# Patient Record
Sex: MALE | Race: WHITE
[De-identification: names, ages, dates, MRNs, and addresses within clinical notes are randomized per-mention and may not be internally consistent; named-entity substitution may affect disease eponyms.]

---

## 2017-06-26 ENCOUNTER — HOSPITAL ENCOUNTER (OUTPATIENT)
Dept: HOSPITAL 17 - PLAB | Age: 52
End: 2017-06-26
Attending: FAMILY MEDICINE
Payer: COMMERCIAL

## 2017-06-26 DIAGNOSIS — E78.5: Primary | ICD-10-CM

## 2017-06-26 LAB
ALP SERPL-CCNC: 103 U/L (ref 45–117)
ALT SERPL-CCNC: 31 U/L (ref 12–78)
ANION GAP SERPL CALC-SCNC: 8 MEQ/L (ref 5–15)
AST SERPL-CCNC: 17 U/L (ref 15–37)
BILIRUB SERPL-MCNC: 0.3 MG/DL (ref 0.2–1)
BUN SERPL-MCNC: 13 MG/DL (ref 7–18)
CHLORIDE SERPL-SCNC: 105 MEQ/L (ref 98–107)
GFR SERPLBLD BASED ON 1.73 SQ M-ARVRAT: 70 ML/MIN (ref 89–?)
HCO3 BLD-SCNC: 27.6 MEQ/L (ref 21–32)
HDLC SERPL-MCNC: 52.7 MG/DL (ref 40–60)
LDLC SERPL-MCNC: 121 MG/DL (ref 0–99)
POTASSIUM SERPL-SCNC: 3.8 MEQ/L (ref 3.5–5.1)
SODIUM SERPL-SCNC: 141 MEQ/L (ref 136–145)

## 2017-06-26 PROCEDURE — 80053 COMPREHEN METABOLIC PANEL: CPT

## 2017-06-26 PROCEDURE — 80061 LIPID PANEL: CPT

## 2017-06-26 PROCEDURE — 36415 COLL VENOUS BLD VENIPUNCTURE: CPT

## 2018-02-15 ENCOUNTER — HOSPITAL ENCOUNTER (EMERGENCY)
Dept: HOSPITAL 17 - PHED | Age: 53
Discharge: HOME | End: 2018-02-15
Payer: SELF-PAY

## 2018-02-15 VITALS — DIASTOLIC BLOOD PRESSURE: 122 MMHG | SYSTOLIC BLOOD PRESSURE: 211 MMHG

## 2018-02-15 VITALS
OXYGEN SATURATION: 99 % | TEMPERATURE: 98.1 F | HEART RATE: 67 BPM | SYSTOLIC BLOOD PRESSURE: 253 MMHG | RESPIRATION RATE: 16 BRPM | DIASTOLIC BLOOD PRESSURE: 129 MMHG

## 2018-02-15 VITALS — WEIGHT: 176.37 LBS | BODY MASS INDEX: 23.37 KG/M2 | HEIGHT: 73 IN

## 2018-02-15 VITALS — DIASTOLIC BLOOD PRESSURE: 132 MMHG | SYSTOLIC BLOOD PRESSURE: 223 MMHG

## 2018-02-15 VITALS — SYSTOLIC BLOOD PRESSURE: 216 MMHG | DIASTOLIC BLOOD PRESSURE: 125 MMHG

## 2018-02-15 DIAGNOSIS — I10: ICD-10-CM

## 2018-02-15 DIAGNOSIS — Z79.899: ICD-10-CM
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
## 2018-02-15 DIAGNOSIS — S20.212A: Primary | ICD-10-CM

## 2018-02-15 DIAGNOSIS — Z91.14: ICD-10-CM

## 2018-02-15 DIAGNOSIS — F17.210: ICD-10-CM

## 2018-02-15 PROCEDURE — 96372 THER/PROPH/DIAG INJ SC/IM: CPT

## 2018-02-15 PROCEDURE — 71111 X-RAY EXAM RIBS/CHEST4/> VWS: CPT

## 2018-02-15 PROCEDURE — 99284 EMERGENCY DEPT VISIT MOD MDM: CPT

## 2018-02-15 PROCEDURE — 93005 ELECTROCARDIOGRAM TRACING: CPT

## 2018-02-15 NOTE — RADRPT
EXAM DATE/TIME:  02/15/2018 09:12 

 

[facility] COMPARISON:     

No previous studies available for comparison.

 

                     

INDICATIONS :     

Left lateral chest/rib pain post fall onto nightstand.

                     

 

MEDICAL HISTORY :     

Hypertension.       Smoker.   

 

SURGICAL HISTORY :     

None.   

 

ENCOUNTER:     

Initial                                        

 

ACUITY:     

3 days      

 

PAIN SCORE:     

8/10

 

LOCATION:     

Left lateral ribs

 

FINDINGS:     

Multiple views of both ribs were performed.  There is no evidence of displaced fracture.  No destruct
bernardo lesions or areas of periosteal thickening are seen.

 

Expiratory view of the chest is negative for pneumothorax.  The mediastinal structures are midline. 

 

CONCLUSION:     

No acute disease.  

 

No evidence of displaced rib fracture.

 

 

 

 Gordon Ash MD on February 15, 2018 at 9:32           

Board Certified Radiologist.

 This report was verified electronically.

## 2018-02-15 NOTE — PD
HPI


Chief Complaint:  Injury


Time Seen by Provider:  09:02


Travel History


International Travel<30 days:  No


Contact w/Intl Traveler<30days:  No


Traveled to known affect area:  No





History of Present Illness


HPI


52-year-old male came to the emergency room with history of left-sided chest 

pain that sounds mostly musculoskeletal in origin.  Patient says that he fell 

Monday and landed on a bedside table.  He had pain almost soon after that.  

Patient tried to take care of the pain at home by taking some Advil.  However 

it has not gotten better and his friends recommended that he should come in 

since he could have a risk of puncturing his lung.  Patient says the pain is 

worse when he takes a deep breath.  It's tender to touch.  Patient also has 

history of hypertension but lost his physician 2 months ago and took his last 

dose of medication then.  His blood pressure was extremely high when he arrived 

in triage.  Patient denies of any headache or blurred vision.  No radiation of 

the pain.  No associated symptoms like shortness of breath, fever, cough or 

hemoptysis.





[facility]


Past Medical History


*** Narrative Medical


List of his past medical, surgical, social and family history is reviewed from 

the nursing note.


Cancer:  No


Cardiovascular Problems:  No


Diminished Hearing:  No


Endocrine:  No


Genitourinary:  No


Immune Disorder:  No


Musculoskeletal:  No


Neurologic:  No


Psychiatric:  No


Reproductive:  No


Respiratory:  No





Social History


Alcohol Use:  Yes ( STATES 18 BEERS PER WK)


Tobacco Use:  Yes (1PPD)


Substance Use:  No





Allergies-Medications


(Allergen,Severity, Reaction):  


Coded Allergies:  


     No Known Allergies (Verified  Allergy, Unknown, 2/15/18)


Comments


No known drug allergies.


Reported Meds & Prescriptions





Reported Meds & Active Scripts


Active


Ibuprofen 600 Mg Tab 600 Mg PO Q6H PRN


Losartan (Losartan Potassium) 50 Mg Tab 50 Mg PO BID


Hydrochlorothiazide 12.5 Mg Cap 12.5 Mg PO DAILY





Narrative Medication


List of his home medications reviewed from the nursing note.





Review of Systems


Except as stated in HPI:  all other systems reviewed are Neg


Cardiovascular:  Positive: Chest Pain or Discomfort


Musculoskeletal:  Positive: Pain





Physical Exam


Narrative


GENERAL: Awake, alert, moderate distress


SKIN: Focused skin assessment warm/dry.


HEAD: Atraumatic. Normocephalic. 


EYES: Pupils equal and round. No scleral icterus. No injection or drainage.  

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


ENT: No nasal bleeding or discharge.  Mucous membranes pink and moist.


NECK: Trachea midline. No JVD. 


CARDIOVASCULAR: Regular rate and rhythm.  No murmur appreciated.


RESPIRATORY: No accessory muscle use. Clear to auscultation. Breath sounds 

equal bilaterally.  Point tenderness at 10 and 11 ribs on the left side.


GASTROINTESTINAL: Abdomen soft, non-tender, nondistended. Hepatic and splenic 

margins not palpable. 


MUSCULOSKELETAL: No obvious deformities. No clubbing.  No cyanosis.  No edema. 


NEUROLOGICAL: Awake and alert. No obvious cranial nerve deficits.  Motor 

grossly within normal limits. Normal speech.


PSYCHIATRIC: Appropriate mood and affect; insight and judgment normal.





Data


Data


Last Documented VS





Vital Signs








  Date Time  Temp Pulse Resp B/P (MAP) Pulse Ox O2 Delivery O2 Flow Rate FiO2


 


2/15/18 11:40    211/122 (151)    


 


2/15/18 09:20      Room Air  


 


2/15/18 08:54 98.1 67 16  99   








Orders





 Orders


Ribs, Bilat(W/Exp Cxr-Min 4vw) (2/15/18 )


Losartan (Cozaar) (2/15/18 09:15)


Clonidine (Catapres) (2/15/18 09:15)


Electrocardiogram (2/15/18 )


Cardiac Monitor / Telemetry [email or web address] (2/15/18 09:07)


Ketorolac Inj (Toradol Inj) (2/15/18 10:15)


Ed Discharge Order (2/15/18 11:15)








[facility]


Medical Decision Making


Medical Screen Exam Complete:  Yes


Emergency Medical Condition:  Yes


Medical Record Reviewed:  Yes


Interpretation(s)


Twelve-lead EKG was reviewed by me.  Normal sinus rhythm, normal axis, 

nonspecific ST T wave changes, sinus bradycardia.  Heart rate of 57 bpm.


Differential Diagnosis


Rib fracture, muscular skeletal pain, pneumothorax


Narrative Course


10:11 AM x-ray series was done with chest x-ray which did not show any 

pneumothorax or rib fracture as per the radiologist.  He was given initially 

losartan and clonidine to get his blood pressure lowered.  I have also ordered 

IM Toradol for his pain.  Blood pressure will be rechecked.





11:12 AM his repeat blood pressure at this point is 211 systolic.  It has come 

down from his initial blood pressure which was 255.  Patient remains 

asymptomatic.  I'm comfortable discharging him home.





Procedures


EKG Prior to Arrival:  No





Diagnosis





 Primary Impression:  


 Contusion of rib on left side


 Qualified Codes:  S20.212A - Contusion of left front wall of thorax, initial 

encounter


 Additional Impressions:  


 Uncontrolled hypertension


 History of medication noncompliance


Referrals:  


[facility]


1 week





***Additional Instructions:  


Return to ER if condition worsens or any other new concerns.  Please refill the 

prescriptions for your high blood pressure.  Her blood pressure was very high 

when you came to the emergency room.  He should be continuously under 

treatment.  Uncontrolled blood pressure like this is extremely dangerous and 

could lead to stroke, heart attack, irreversible eye or kidney damage.  Follow-

up with the primary care or the walk-in clinic who is name and information that 

is provided to you on this discharge paper.


***Med/Other Pt SpecificInfo:  Prescription(s) given


Scripts


Ibuprofen (Ibuprofen) 600 Mg Tab


600 MG PO Q6H Y for Pain/Inflammation, #40 TAB 0 Refills


   Prov: Ene Rudd MD         2/15/18 


Losartan (Losartan) 50 Mg Tab


50 MG PO BID for Blood Pressure Management, #60 TAB 4 Refills


   Prov: Ene Rudd MD         2/15/18 


Hydrochlorothiazide (Hydrochlorothiazide) 12.5 Mg Cap


12.5 MG PO DAILY, #30 CAP 4 Refills


   Prov: Ene Rudd MD         2/15/18


Disposition:  01 DISCHARGE HOME


Condition:  Stable











Ene Rudd MD Feb 15, 2018 09:03

## 2018-02-15 NOTE — EKG
Date Performed: 02/15/2018       Time Performed: 09:13:57

 

PTAGE:      52 years

 

EKG:      SINUS BRADYCARDIA NONSPECIFIC ST & T-WAVE ABNORMALITY PROLONGED QT INTERVAL ABNORMAL ECG 

 

NO PREVIOUS TRACING            

 

DOCTOR:   Kamari Weldon  Interpretating Date/Time  02/15/2018 14:29:23